# Patient Record
Sex: MALE | Race: WHITE | NOT HISPANIC OR LATINO | URBAN - METROPOLITAN AREA
[De-identification: names, ages, dates, MRNs, and addresses within clinical notes are randomized per-mention and may not be internally consistent; named-entity substitution may affect disease eponyms.]

---

## 2023-10-26 ENCOUNTER — OFFICE VISIT (OUTPATIENT)
Dept: PRIMARY CARE | Facility: CLINIC | Age: 58
End: 2023-10-26
Payer: COMMERCIAL

## 2023-10-26 VITALS
DIASTOLIC BLOOD PRESSURE: 80 MMHG | OXYGEN SATURATION: 99 % | SYSTOLIC BLOOD PRESSURE: 130 MMHG | HEIGHT: 68 IN | HEART RATE: 76 BPM | WEIGHT: 173 LBS | BODY MASS INDEX: 26.22 KG/M2

## 2023-10-26 DIAGNOSIS — R03.0 ELEVATED BP WITHOUT DIAGNOSIS OF HYPERTENSION: ICD-10-CM

## 2023-10-26 DIAGNOSIS — M79.652 PAIN IN BOTH THIGHS: ICD-10-CM

## 2023-10-26 DIAGNOSIS — R61 HYPERHIDROSIS: Primary | ICD-10-CM

## 2023-10-26 DIAGNOSIS — R73.01 IFG (IMPAIRED FASTING GLUCOSE): ICD-10-CM

## 2023-10-26 DIAGNOSIS — Z12.11 ENCOUNTER FOR SCREENING FOR MALIGNANT NEOPLASM OF COLON: ICD-10-CM

## 2023-10-26 DIAGNOSIS — F17.210 CIGARETTE NICOTINE DEPENDENCE WITHOUT COMPLICATION: ICD-10-CM

## 2023-10-26 DIAGNOSIS — Z12.5 SCREENING PSA (PROSTATE SPECIFIC ANTIGEN): ICD-10-CM

## 2023-10-26 DIAGNOSIS — E78.5 HYPERLIPIDEMIA, UNSPECIFIED HYPERLIPIDEMIA TYPE: ICD-10-CM

## 2023-10-26 DIAGNOSIS — R53.83 OTHER FATIGUE: ICD-10-CM

## 2023-10-26 DIAGNOSIS — M79.651 PAIN IN BOTH THIGHS: ICD-10-CM

## 2023-10-26 LAB
ALBUMIN SERPL BCP-MCNC: 4.5 G/DL (ref 3.4–5)
ALP SERPL-CCNC: 90 U/L (ref 33–120)
ALT SERPL W P-5'-P-CCNC: 21 U/L (ref 10–52)
ANION GAP SERPL CALC-SCNC: 15 MMOL/L (ref 10–20)
AST SERPL W P-5'-P-CCNC: 20 U/L (ref 9–39)
BILIRUB SERPL-MCNC: 0.4 MG/DL (ref 0–1.2)
BUN SERPL-MCNC: 16 MG/DL (ref 6–23)
CALCIUM SERPL-MCNC: 10.1 MG/DL (ref 8.6–10.6)
CHLORIDE SERPL-SCNC: 103 MMOL/L (ref 98–107)
CHOLEST SERPL-MCNC: 227 MG/DL (ref 0–199)
CHOLESTEROL/HDL RATIO: 6.8
CO2 SERPL-SCNC: 28 MMOL/L (ref 21–32)
CREAT SERPL-MCNC: 0.9 MG/DL (ref 0.5–1.3)
ERYTHROCYTE [DISTWIDTH] IN BLOOD BY AUTOMATED COUNT: 14 % (ref 11.5–14.5)
GFR SERPL CREATININE-BSD FRML MDRD: >90 ML/MIN/1.73M*2
GLUCOSE SERPL-MCNC: 80 MG/DL (ref 74–99)
HCT VFR BLD AUTO: 49.5 % (ref 41–52)
HDLC SERPL-MCNC: 33.4 MG/DL
HGB BLD-MCNC: 15.9 G/DL (ref 13.5–17.5)
LDLC SERPL CALC-MCNC: 158 MG/DL
MCH RBC QN AUTO: 29.5 PG (ref 26–34)
MCHC RBC AUTO-ENTMCNC: 32.1 G/DL (ref 32–36)
MCV RBC AUTO: 92 FL (ref 80–100)
NON HDL CHOLESTEROL: 194 MG/DL (ref 0–149)
NRBC BLD-RTO: 0 /100 WBCS (ref 0–0)
PLATELET # BLD AUTO: 337 X10*3/UL (ref 150–450)
PMV BLD AUTO: 10.2 FL (ref 7.5–11.5)
POTASSIUM SERPL-SCNC: 4.8 MMOL/L (ref 3.5–5.3)
PROT SERPL-MCNC: 7.9 G/DL (ref 6.4–8.2)
PSA SERPL-MCNC: 1.59 NG/ML
RBC # BLD AUTO: 5.39 X10*6/UL (ref 4.5–5.9)
SODIUM SERPL-SCNC: 141 MMOL/L (ref 136–145)
TRIGL SERPL-MCNC: 176 MG/DL (ref 0–149)
TSH SERPL-ACNC: 0.92 MIU/L (ref 0.44–3.98)
VLDL: 35 MG/DL (ref 0–40)
WBC # BLD AUTO: 9 X10*3/UL (ref 4.4–11.3)

## 2023-10-26 PROCEDURE — 84443 ASSAY THYROID STIM HORMONE: CPT

## 2023-10-26 PROCEDURE — 82570 ASSAY OF URINE CREATININE: CPT

## 2023-10-26 PROCEDURE — 80061 LIPID PANEL: CPT

## 2023-10-26 PROCEDURE — 83036 HEMOGLOBIN GLYCOSYLATED A1C: CPT

## 2023-10-26 PROCEDURE — 85027 COMPLETE CBC AUTOMATED: CPT

## 2023-10-26 PROCEDURE — 80053 COMPREHEN METABOLIC PANEL: CPT

## 2023-10-26 PROCEDURE — 84153 ASSAY OF PSA TOTAL: CPT

## 2023-10-26 PROCEDURE — 4004F PT TOBACCO SCREEN RCVD TLK: CPT | Performed by: INTERNAL MEDICINE

## 2023-10-26 PROCEDURE — 99204 OFFICE O/P NEW MOD 45 MIN: CPT | Performed by: INTERNAL MEDICINE

## 2023-10-26 PROCEDURE — 36415 COLL VENOUS BLD VENIPUNCTURE: CPT

## 2023-10-26 PROCEDURE — 82043 UR ALBUMIN QUANTITATIVE: CPT

## 2023-10-26 NOTE — PROGRESS NOTES
"Subjective   Patient ID: Jojo Lima is a 58 y.o. male who presents for Establish Care (New patient here to establish care).    HPI   PMH significant for hyperlipidemia, presents with 5 week history of nocturnal sweating, \"head to toe\".  C/O fatigue.  C/O intermittent pain in both thighs, while lying on his back and upon getting up in the morning.  Recorded BP elevation on home BP monitor.  Reports 30 pack year smoking history, not ready to quit.  Review of Systems  Fatigue  Excessive sweating  Pain in both thighs  Objective   Pulse 76   Ht 1.72 m (5' 7.72\")   Wt 78.5 kg (173 lb)   SpO2 99%   BMI 26.52 kg/m²     Physical Exam  NAD. Cooperative.  HEENT: WNL  Neck: WNL  Lungs CTA  Heart: RRR  Abdomen: WNL  Musculoskeletal system: WNL  Neurologic exam: WNL    Assessment/Plan   Diagnoses and all orders for this visit:  Hyperhidrosis  -     Comprehensive metabolic panel; Future  -     CBC; Future  -     TSH; Future  Cigarette nicotine dependence without complication  -     CT lung screening low dose; Future  Other fatigue  Elevated BP without diagnosis of hypertension  -     Albumin, urine, random; Future  Screening PSA (prostate specific antigen)  -     Prostate Spec.Ag,Screen; Future  Hyperlipidemia, unspecified hyperlipidemia type  -     Lipid panel; Future  -     CT cardiac scoring wo IV contrast; Future  IFG (impaired fasting glucose)  -     Hemoglobin A1C; Future  Pain in both thighs  Discussed diagnostic possibilities, spinal stenosis vs. Vascular etiology.  Recommended smoking cessation, lab testing, close office follow up in 2 weeks.  Smoking cessation was strongly recommended, discussed health  benefits associated with smoking cessation. Provided information for national health support line at 1- 800- QUIT-NOW. Discussed lung cancer screening with low dose chest CT.        "

## 2023-10-27 LAB
CREAT UR-MCNC: 135 MG/DL (ref 20–370)
EST. AVERAGE GLUCOSE BLD GHB EST-MCNC: 123 MG/DL
HBA1C MFR BLD: 5.9 %
MICROALBUMIN UR-MCNC: 16.4 MG/L
MICROALBUMIN/CREAT UR: 12.1 UG/MG CREAT

## 2023-11-12 ENCOUNTER — HOSPITAL ENCOUNTER (OUTPATIENT)
Dept: RADIOLOGY | Facility: HOSPITAL | Age: 58
Discharge: HOME | End: 2023-11-12
Payer: COMMERCIAL

## 2023-11-12 DIAGNOSIS — F17.210 CIGARETTE NICOTINE DEPENDENCE WITHOUT COMPLICATION: ICD-10-CM

## 2023-11-12 DIAGNOSIS — E78.5 HYPERLIPIDEMIA, UNSPECIFIED HYPERLIPIDEMIA TYPE: ICD-10-CM

## 2023-11-12 PROCEDURE — 71271 CT THORAX LUNG CANCER SCR C-: CPT

## 2023-11-12 PROCEDURE — 75571 CT HRT W/O DYE W/CA TEST: CPT

## 2023-11-13 DIAGNOSIS — R93.1 ELEVATED CORONARY ARTERY CALCIUM SCORE: ICD-10-CM

## 2023-11-13 DIAGNOSIS — E78.5 HYPERLIPIDEMIA, UNSPECIFIED HYPERLIPIDEMIA TYPE: Primary | ICD-10-CM

## 2023-11-13 PROBLEM — F17.200 NICOTINE DEPENDENCE: Status: ACTIVE | Noted: 2023-11-13

## 2023-11-13 RX ORDER — ROSUVASTATIN CALCIUM 20 MG/1
20 TABLET, COATED ORAL NIGHTLY
Qty: 90 TABLET | Refills: 1 | Status: SHIPPED | OUTPATIENT
Start: 2023-11-13 | End: 2024-05-11

## 2023-11-13 RX ORDER — ASPIRIN 81 MG/1
81 TABLET ORAL DAILY
Qty: 30 TABLET | Refills: 11 | COMMUNITY
Start: 2023-11-13

## 2023-12-07 ENCOUNTER — OFFICE VISIT (OUTPATIENT)
Dept: CARDIOLOGY | Facility: CLINIC | Age: 58
End: 2023-12-07
Payer: COMMERCIAL

## 2023-12-07 VITALS
OXYGEN SATURATION: 99 % | SYSTOLIC BLOOD PRESSURE: 136 MMHG | HEART RATE: 66 BPM | WEIGHT: 178.6 LBS | HEIGHT: 69 IN | DIASTOLIC BLOOD PRESSURE: 83 MMHG | BODY MASS INDEX: 26.45 KG/M2

## 2023-12-07 DIAGNOSIS — E78.5 HYPERLIPIDEMIA, UNSPECIFIED HYPERLIPIDEMIA TYPE: ICD-10-CM

## 2023-12-07 DIAGNOSIS — R93.1 ELEVATED CORONARY ARTERY CALCIUM SCORE: ICD-10-CM

## 2023-12-07 PROCEDURE — 99214 OFFICE O/P EST MOD 30 MIN: CPT | Mod: 25,27 | Performed by: INTERNAL MEDICINE

## 2023-12-07 PROCEDURE — 93010 ELECTROCARDIOGRAM REPORT: CPT | Performed by: INTERNAL MEDICINE

## 2023-12-07 PROCEDURE — 99204 OFFICE O/P NEW MOD 45 MIN: CPT | Performed by: INTERNAL MEDICINE

## 2023-12-07 PROCEDURE — 93005 ELECTROCARDIOGRAM TRACING: CPT | Performed by: INTERNAL MEDICINE

## 2023-12-07 PROCEDURE — 4004F PT TOBACCO SCREEN RCVD TLK: CPT | Performed by: INTERNAL MEDICINE

## 2023-12-07 ASSESSMENT — PATIENT HEALTH QUESTIONNAIRE - PHQ9
SUM OF ALL RESPONSES TO PHQ9 QUESTIONS 1 & 2: 0
2. FEELING DOWN, DEPRESSED OR HOPELESS: NOT AT ALL
1. LITTLE INTEREST OR PLEASURE IN DOING THINGS: NOT AT ALL

## 2023-12-07 ASSESSMENT — COLUMBIA-SUICIDE SEVERITY RATING SCALE - C-SSRS
1. IN THE PAST MONTH, HAVE YOU WISHED YOU WERE DEAD OR WISHED YOU COULD GO TO SLEEP AND NOT WAKE UP?: NO
6. HAVE YOU EVER DONE ANYTHING, STARTED TO DO ANYTHING, OR PREPARED TO DO ANYTHING TO END YOUR LIFE?: NO
2. HAVE YOU ACTUALLY HAD ANY THOUGHTS OF KILLING YOURSELF?: NO

## 2023-12-07 ASSESSMENT — PAIN SCALES - GENERAL: PAINLEVEL: 0-NO PAIN

## 2023-12-07 ASSESSMENT — ENCOUNTER SYMPTOMS
OCCASIONAL FEELINGS OF UNSTEADINESS: 0
DEPRESSION: 0
LOSS OF SENSATION IN FEET: 0

## 2023-12-07 NOTE — ASSESSMENT & PLAN NOTE
Elevated calcium score above 90% for his age in a setting of elevated LDL and cigarette smoking. He has been started on Rosuvastatin and I did emphasize strongly the need to continue it . Discussed lifestyle modification Smoking cessation.       We will obtain stress ECHO to assess for structural and functional impairment.

## 2023-12-07 NOTE — PROGRESS NOTES
"I had the pleasure seeing Jojo Lima     Chief Complaint   Patient presents with    Elevated Coronary Artery Calcium Score     OUTPATIENT CONSULTATION: Cardiac Electrophysiology  DOS: December 7, 2023   REASON: Hyperlipidemia and elevated CACS  REFERRING PHYSICIAN: Masha Arauz           Current Outpatient Medications on File Prior to Visit   Medication Sig Dispense Refill    aspirin 81 mg EC tablet Take 1 tablet (81 mg) by mouth once daily. 30 tablet 11    rosuvastatin (Crestor) 20 mg tablet Take 1 tablet (20 mg) by mouth once daily at bedtime. 90 tablet 1     No current facility-administered medications on file prior to visit.          Jojo Lima is a 58 y.o. with:     1. Smoker  2. HL  3. Elevated Coronary Artery Calcium Score - (11/13/2023) LM 0, , LCx 3.14, RCA 96.38,   Total 394      Objective   Physical Exam  /83 (BP Location: Left arm, Patient Position: Sitting, BP Cuff Size: Adult)   Pulse 66   Ht 1.753 m (5' 9\")   Wt 81 kg (178 lb 9.6 oz)   SpO2 99%   BMI 26.37 kg/m²     General Appearance:  Alert, cooperative, no distress, appears stated age   Head:  Normocephalic, without obvious abnormality, atraumatic   Eyes:  PERRL, conjunctiva/corneas clear, EOM's intact, fundi benign, both eyes   Ears:  Normal TM's and external ear canals, both ears   Nose: Nares normal, septum midline, mucosa normal, no drainage or sinus tenderness   Throat: Lips, mucosa, and tongue normal; teeth and gums normal   Neck: Supple, symmetrical, trachea midline, no adenopathy, thyroid: not enlarged, symmetric, no tenderness/mass/nodules, no carotid bruit or JVD   Back:   Symmetric, no curvature, ROM normal, no CVA tenderness   Lungs:   Clear to auscultation bilaterally, respirations unlabored   Chest Wall:  No tenderness or deformity   Heart:  Regular rate and rhythm, S1, S2 normal, no murmur, rub or gallop   Abdomen:   Soft, non-tender, bowel sounds active all four quadrants,  no masses, no organomegaly " "  Genitalia:  Normal male   Rectal:  Normal tone, normal prostate, no masses or tenderness;  guaiac negative stool   Extremities: Extremities normal, atraumatic, no cyanosis or edema   Pulses: 2+ and symmetric   Skin: Skin color, texture, turgor normal, no rashes or lesions   Lymph nodes: Cervical, supraclavicular, and axillary nodes normal   Neurologic: Normal        Blood pressure 136/83, pulse 66, height 1.753 m (5' 9\"), weight 81 kg (178 lb 9.6 oz), SpO2 99 %.     Assessment/Plan   Elevated coronary artery calcium score  Elevated calcium score above 90% for his age in a setting of elevated LDL and cigarette smoking. He has been started on Rosuvastatin and I did emphasize strongly the need to continue it . Discussed lifestyle modification Smoking cessation.       We will obtain stress ECHO to assess for structural and functional impairment.     Hyperlipidemia  On Rosuvastatin - target LDL 70 mg/dl. We will repeat lipid profile in 3 months.             "

## 2023-12-21 LAB
ATRIAL RATE: 61 BPM
P AXIS: 60 DEGREES
P OFFSET: 162 MS
P ONSET: 113 MS
PR INTERVAL: 198 MS
Q ONSET: 212 MS
QRS COUNT: 10 BEATS
QRS DURATION: 104 MS
QT INTERVAL: 420 MS
QTC CALCULATION(BAZETT): 422 MS
QTC FREDERICIA: 422 MS
R AXIS: 50 DEGREES
T AXIS: 49 DEGREES
T OFFSET: 422 MS
VENTRICULAR RATE: 61 BPM

## 2024-01-03 ENCOUNTER — HOSPITAL ENCOUNTER (OUTPATIENT)
Dept: CARDIOLOGY | Facility: CLINIC | Age: 59
Discharge: HOME | End: 2024-01-03
Payer: COMMERCIAL

## 2024-01-03 DIAGNOSIS — E78.5 HYPERLIPIDEMIA, UNSPECIFIED HYPERLIPIDEMIA TYPE: ICD-10-CM

## 2024-01-03 DIAGNOSIS — R93.1 ELEVATED CORONARY ARTERY CALCIUM SCORE: ICD-10-CM

## 2024-01-03 PROCEDURE — 93017 CV STRESS TEST TRACING ONLY: CPT

## 2024-01-03 PROCEDURE — 93350 STRESS TTE ONLY: CPT | Performed by: INTERNAL MEDICINE

## 2024-01-03 PROCEDURE — 93018 CV STRESS TEST I&R ONLY: CPT | Performed by: INTERNAL MEDICINE

## 2024-01-03 PROCEDURE — 93016 CV STRESS TEST SUPVJ ONLY: CPT | Performed by: INTERNAL MEDICINE

## 2024-08-05 DIAGNOSIS — E78.5 HYPERLIPIDEMIA, UNSPECIFIED HYPERLIPIDEMIA TYPE: ICD-10-CM

## 2024-08-06 RX ORDER — ROSUVASTATIN CALCIUM 20 MG/1
20 TABLET, COATED ORAL NIGHTLY
Qty: 90 TABLET | Refills: 0 | Status: SHIPPED | OUTPATIENT
Start: 2024-08-06